# Patient Record
(demographics unavailable — no encounter records)

---

## 2024-10-09 NOTE — PHYSICAL EXAM
[de-identified] : Vel test demonstrates intact palmar arch bilaterally. There is no significant scarring of the forearms. 2+ DP, popliteal, and femoral pulses. [de-identified] : Genital exam performed with a medical chaperone present (DM). There is virilization of the clitoris. The labia minora are developed. Urethra appears normal. There are no visible skin lesions.

## 2024-10-09 NOTE — HISTORY OF PRESENT ILLNESS
[FreeTextEntry1] : The patient returns to clinic for initial consultation for genital surgery. She is in the process of considering a name and pronoun change. She is specifically interested in phalloplasty with urethral lengthening. Her goals are to be able to stand to urinate and to engage in penetrative intercourse with a partner. She is eventually interested in vaginal closure and penile prosthesis. She expresses preference for a left forearm donor site as she is right-handed. She has been on testosterone since 8/2022. She underwent hysterectomy with Dr. Adhikari in 9/2024 and bilateral mastectomy with our practice 2/2023. She did not previously undergone fertility preservation prior hysterectomy and expressed no interest in ever doing so. She has not undergone any genital operations. She has not undergone any hair removal and has no history of pregnancy. She denies any issues with general anesthesia or personal or FHx of blood clots or bleeding disorders.  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 She lives alone. She states she would stay with her sister, who lives in the area, after surgery. She denies nicotine, alcohol, or other recreational drug use. Patient denies any history of psychiatric hospitalization or ER admission.

## 2024-10-09 NOTE — HISTORY OF PRESENT ILLNESS
[FreeTextEntry1] : The patient returns to clinic for initial consultation for genital surgery. She is in the process of considering a name and pronoun change. She is specifically interested in phalloplasty with urethral lengthening. Her goals are to be able to stand to urinate and to engage in penetrative intercourse with a partner. She is eventually interested in vaginal closure and penile prosthesis. She expresses preference for a left forearm donor site as she is right-handed. She has been on testosterone since 8/2022. She underwent hysterectomy with Dr. Adhikari in 9/2024 and bilateral mastectomy with our practice 2/2023. She did not previously undergone fertility preservation prior hysterectomy and expressed no interest in ever doing so. She has not undergone any genital operations. She has not undergone any hair removal and has no history of pregnancy. She denies any issues with general anesthesia or personal or FHx of blood clots or bleeding disorders.  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 She states she would stay with her sister, who lives in the area, after surgery. She denies nicotine, alcohol, or other recreational drug use. Patient denies any history of psychiatric hospitalization or ER admission.

## 2024-10-09 NOTE — ASSESSMENT
[FreeTextEntry1] : The patient is interested in preceding with phalloplasty and urethral lengthening. The patient expresses preference for the left forearm use for donor site. He also expresses preference for scrotoplasty and vaginal closure. He is eventually interested in erectile prosthesis. We reviewed the significant limitations of erectile prostheses.  Advised patient to begin hair removal, resources given. We recommend he return for further discussion about 6 months.  I, Dr. Zee, personally performed the evaluation and management (E/M) services for this established patient who presents today with continued gender dysphoria. That E/M includes conducting the clinically appropriate interval history &/or exam, assessing all new/exacerbated conditions, and establishing a new plan of care. Today, my STEFANIA, Nicolas Gregory PA-C, was here to observe my evaluation and management service for this continued condition and follow the plan of care established by me going forward.

## 2024-10-09 NOTE — PHYSICAL EXAM
[de-identified] : Vel test demonstrates intact palmar arch bilaterally. There is no significant scarring of the forearms. 2+ DP, popliteal, and femoral pulses. [de-identified] : Genital exam performed with a medical chaperone present (DM). There is virilization of the clitoris. The labia minora are developed. Urethra appears normal. There are no visible skin lesions.

## 2024-10-27 NOTE — HISTORY OF PRESENT ILLNESS
[de-identified] : 42 year old female presents s/p pelvic exam under anesthesia, total laparoscopic hysterectomy, bilateral salpingo-oophorectomy, and cystoscopy.  C/o pressure in pelvic area with movement, especially when trying to sit up.

## 2024-10-27 NOTE — PLAN
[de-identified] : Pt cleared for all nml activities. [FreeTextEntry1] : reviewed precautions pt to notify if sx worsen or don't improve vaginitis panel today

## 2024-10-27 NOTE — ASSESSMENT
[Doing Well] : is doing well [No Sign of Infection] : is showing no signs of infection [de-identified] : vagjnal cuff well healed, sutures seen

## 2024-10-27 NOTE — HISTORY OF PRESENT ILLNESS
[de-identified] : 42 year old female presents s/p pelvic exam under anesthesia, total laparoscopic hysterectomy, bilateral salpingo-oophorectomy, and cystoscopy.  C/o pressure in pelvic area with movement, especially when trying to sit up.

## 2024-10-27 NOTE — PLAN
[de-identified] : Pt cleared for all nml activities. [FreeTextEntry1] : reviewed precautions pt to notify if sx worsen or don't improve vaginitis panel today

## 2024-10-27 NOTE — END OF VISIT
[FreeTextEntry3] : I, Vika Begum, acted as a scribe on behalf of Dr. Dee Adhikari on 10/25/2024.   All medical entries made by the scribe were at my, Dr. Dee Adhikari, direction and personally dictated by me on 10/25/2024. I have reviewed the chart and agree that the record accurately reflects my personal performance of the history, physical exam, assessment and plan. I have also personally directed, reviewed, and agreed with the chart.

## 2024-10-27 NOTE — ASSESSMENT
[Doing Well] : is doing well [No Sign of Infection] : is showing no signs of infection [de-identified] : vagjnal cuff well healed, sutures seen

## 2024-11-15 NOTE — PLAN
[FreeTextEntry1] : Gender Dysphoria: check labs on new dose at 3 months. Advised patient to call with any questions or concerns. Patient verbalized understanding.    Injection training completed with RN 1. Wash your hands Wash your hands with soap and warm water to prevent potential infection. Be sure to thoroughly scrub between your fingers, on the backs of your hands, and under your fingernails.  2. Gather all the needed supplies Assemble the following supplies: -Needle and syringe with medication -Alcohol pads -Gauze -Puncture-resistant container to discard the used needles and syringe (typically a red, plastic sharps container) bandages  3. Locate the injection site The person receiving the injection should get into a position that is comfortable, provides easy access to the location, and stay relaxed.  4. Clean the injection site Clean the site selected for injection with an alcohol swab and allow the skin to air dry.  5. Prepare the syringe with medication Remove the cap. The rubber stopper should be cleaned with an alcohol swab. Draw air into the syringe. Draw back the plunger to fill the syringe with air up to the dose that youll be injecting. This is done because the vial is a vacuum and you need to add an equal amount of air to regulate the pressure. This also makes it easier to draw the medication into the syringe. If you forget this step, you can still get the medication out of the vial. Insert air into the vial. Remove the cap from the needle and push the needle through the rubber stopper at the top of the vial. Inject all of the air into the vial. Be careful to not touch the needle to keep it clean. Withdraw the medication. Turn the vial and syringe upside down so the needle points upward and pull back on the plunger to withdraw the correct amount of medication. Remove air bubbles. Tap the syringe to push any bubbles to the top and gently depress the plunger to push the air bubbles out.  6. Self-inject with a syringe Hold the needle like a dart and insert it into the subcutaneous fat at a 45-degree angle. You should insert the needle in a quick but controlled manner. Do not push the plunger in.  7. Inject the medication Push the plunger slowly to inject the medication into the muscle.  8. Remove the needle Withdraw the needle quickly and discard it into a puncture-resistant sharps container. Do not put the cap back on the needle. A sharps container is a red container that you can get at your pharmacy. Its used to collect medical waste, like needles and syringes. You should not put any of these materials into the regular garbage, as needles can be hazardous to anyone who handles the trash.  9. Apply pressure to the injection site Use a piece of gauze to apply light pressure to the injection site. You can even massage the area to help the medication be absorbed into the muscle. Its normal to see slight bleeding. Use a bandage if necessary.   Patient was able to perform injection correctly with minimal prompting and correct technique. Patient can proceed with home injections. Advised to call with any questions or concerns. Patient verbalized understanding.

## 2024-11-15 NOTE — HISTORY OF PRESENT ILLNESS
[FreeTextEntry1] : f/u [de-identified] : Patient here today for f/u  Gender Dysphoria: started gaht with PCP 8/2022 dr. posada - testosterone injections. Now on decreased dose going well. Tried IM injections did not go well, here today for SQ training    Last injection: n/a  SH: patient working for [employer], was going to retire but then got promotion to [employer]. Patient is out to a couple of people at work, but working with lgbtq groups at work - currently highest ranking trans person in [facility]. Will be going to group meeting next week, not sure she wants to be at forefront of everything and still may retire yet. Taking it day by day. 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 [facility] that will write letter of support  Pt denies any SOB, cough, chest pain, palpitations, N/V/D/C, fever, or chills. No other health concerns today.

## 2025-02-21 NOTE — PLAN
[FreeTextEntry1] : Gender Dysphoria: stable. Will order routine blood work and f/u results to patient once made available - will send [facility] message with results and dose adjustment if needed. F/u 3-6 months. Advised patient to call with any questions or concerns. Patient verbalized understanding.

## 2025-02-21 NOTE — HISTORY OF PRESENT ILLNESS
[FreeTextEntry1] : f/u [de-identified] : Patient here today for f/u  HLD: Patient reports working out more. And reducing diet, reduced carb intake. Cooking at home. Eating more veggies. Ate today greek yogurt, almonds, honey, blueberries  Gender Dysphoria: started gaht with PCP 8/2022 dr. posada - testosterone injections. Patient had top surgery with dr. child 2/6/24. Patient had hysterectomy 9/24, looking into phalloplasty. Feeling good on testosterone, donating blood every 3 months. Subq in stomach has been going, feeling good on current dose. Has noticed a lot more changes with weekly injections vs monthly. [facility] with [facility], [facility]   Last injection: this morning 2 hours ago 2/21/25  SH: patient working for [email or web address], was going to retire but then got promotion to [employer]. Patient is out to a couple of people at work, but working with lgbtq groups at work - currently highest ranking trans person in [facility]. 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 Working a lot but just took 2 weeks off vacation, drove to [place] was great weather  MH: no concerns, denies thoughts of hurting self or anyone else. [facility] team [facility]  Pt denies any SOB, cough, chest pain, palpitations, N/V/D/C, fever, or chills. No other health concerns today.

## 2025-04-28 NOTE — HISTORY OF PRESENT ILLNESS
[FreeTextEntry1] : f/u, alfonzo [de-identified] : Patient here today for f/u  HLD: Patient reports building muscle mass, weight hasnt changed but working out a lot/lifting weights. Taking creatine supplements, vitamin D, magnesium, fish oil, and collagen, powder greens - not everyday taking every couple days. She is eating a lot healthier protein rich foods, only had mcd[employer]ds once in 2 months. Fasting today   Gender Dysphoria: started gaht with PCP 8/2022 dr. posada - testosterone injections. S/p top surgery 2/24 and hysterectomy 9/24, looking into phalloplasty. Feeling good on testosterone, donating blood every 3 months last done friday. Waiting for testosterone injection for todays labs   Last injection: friday 4/18/25  SH: patient working for [employer], was going to retire but then got promotion to [employer]. Patient is out to a couple of people at work, but working with lgbtq groups at work - currently highest ranking trans person in [facility]. 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 [facility]  Pt denies any SOB, cough, chest pain, palpitations, N/V/D/C, fever, or chills. No other health concerns today.

## 2025-04-28 NOTE — PLAN
[FreeTextEntry1] : Gender Dysphoria: stable. Discussed next blood work should be mid cycle injection 3-4 days after injection. Will order routine blood work and f/u results to patient once made available - will send [facility] message with results and dose adjustment if needed. F/u 3-6 months. Advised patient to call with any questions or concerns. Patient verbalized understanding.   Elevated cholesterol: patient following lifestyle changes, continue exercise and low sat fat/carb diet. Advised supplements not FDA regulated caution, avoid creatine supplementation. Will order routine blood work and f/u results to patient once made available. Advised patient to call with any questions or concerns. Patient verbalized understanding.

## 2025-07-16 NOTE — PLAN
[FreeTextEntry1] : Gender Dysphoria: stable. Will order routine blood work and f/u results to patient once made available - will send [facility] message with results and dose adjustment if needed. F/u 3-6 months. Advised patient to call with any questions or concerns. Patient verbalized understanding.   Elevated cholesterol: patient following lifestyle changes, continue exercise and low sat fat/carb diet. Advised supplements not FDA regulated caution, avoid creatine supplementation. Will order routine blood work and f/u results to patient once made available. Advised patient to call with any questions or concerns. Patient verbalized understanding.   Bronchitis: [facility] and medications under [facility]. Educated patient to call/f/u if no improvement or worsening symptoms. Advised to go to ED with any high fevers, dysphagia, sob, chest pain, palpitations, worsening headache etc. Advised patient to call with any questions or concerns. Patient verbalized understanding.

## 2025-07-16 NOTE — HEALTH RISK ASSESSMENT
[HIV test declined] : HIV test declined [Hepatitis C test declined] : Hepatitis C test declined [PapSmearComments] : N/A s/p total hysto

## 2025-07-16 NOTE — HISTORY OF PRESENT ILLNESS
[FreeTextEntry1] : f/u, alfonzo [de-identified] : Patient here today for f/u  Patient reports 2 weeks ago wasnt feeling well had cough +mucus, quaratined at home. She reports tylenol/nsaods, fluids, tea, vitamin C, and mucinex. She thought there was improvement but not fast enough. She went to urgent care last week, took home covid test was negative. Requested abx, and provider said no. She reports was wheezing, was referred for chest xray and given nebulizer. She reports when walking had trouble breathing, pulse ox was dropping when coughing. Patient reports doctor didnt know what was going on went to ER was 145/96, doc said mucinex could elevate it. BP reduced 125/92 after 10 minutes. [facility] [place] [facility], had CXR and strong nebulizer which helped. Patient has been on steroid, albuterol, probiotics, cough suppressant, and augmentin. Patient reports slowly feeling better since starting medications, energy levels getting better. She reports was inconclusive and CXR negative. Covid swab negative   She reports she is also going to follow up with her providers [facility] the [facility] regarding workup as part of being  who served in [place], exposed to different toxins   HLD: Patient reports building muscle mass, weight hasnt changed but working out a lot/lifting weights. Taking creatine supplements, vitamin D, magnesium, fish oil, and collagen, powder greens - not everyday taking every couple days. She is trying to follow healthier diet  Gender Dysphoria: started gaht with PCP 8/2022 dr. posada - testosterone injections. S/p top surgery 2/24 and hysterectomy 9/24, looking into phalloplasty. Feeling good on testosterone, donating blood every 3 months last done friday. Feeling good on current dose, reports getting harrier    Last injection: monday 7/14/25  SH: patient working for [employer], was going to retire but then got promotion to [employer]. Patient is out to a couple of people at work, but working with lgbtq groups at work - currently highest ranking trans person in [facility]. 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 She reports she was sick on bday, just stayed home and played play station  MH: no concerns, denies thoughts of hurting self or anyone else. [facility] team from [facility]  Pt denies any SOB, cough, chest pain, palpitations, N/V/D/C, fever, or chills. No other health concerns today.